# Patient Record
Sex: MALE | Race: WHITE
[De-identification: names, ages, dates, MRNs, and addresses within clinical notes are randomized per-mention and may not be internally consistent; named-entity substitution may affect disease eponyms.]

---

## 2017-03-08 ENCOUNTER — HOSPITAL ENCOUNTER (OUTPATIENT)
Dept: HOSPITAL 84 - D.CATH | Age: 77
Discharge: HOME | End: 2017-03-08
Attending: INTERNAL MEDICINE
Payer: MEDICARE

## 2017-03-08 VITALS
HEIGHT: 68 IN | WEIGHT: 200.42 LBS | HEIGHT: 68 IN | BODY MASS INDEX: 30.38 KG/M2 | BODY MASS INDEX: 30.38 KG/M2 | WEIGHT: 200.42 LBS

## 2017-03-08 VITALS — DIASTOLIC BLOOD PRESSURE: 69 MMHG | SYSTOLIC BLOOD PRESSURE: 150 MMHG

## 2017-03-08 DIAGNOSIS — I25.119: Primary | ICD-10-CM

## 2017-03-08 DIAGNOSIS — Z95.5: ICD-10-CM

## 2017-03-08 LAB
ANION GAP SERPL CALC-SCNC: 10.4 MMOL/L (ref 8–16)
BASOPHILS NFR BLD AUTO: 0.6 % (ref 0–2)
BUN SERPL-MCNC: 21 MG/DL (ref 7–18)
CALCIUM SERPL-MCNC: 9 MG/DL (ref 8.5–10.1)
CHLORIDE SERPL-SCNC: 106 MMOL/L (ref 98–107)
CO2 SERPL-SCNC: 28.7 MMOL/L (ref 21–32)
CREAT SERPL-MCNC: 1.2 MG/DL (ref 0.6–1.3)
EOSINOPHIL NFR BLD: 4.3 % (ref 0–7)
ERYTHROCYTE [DISTWIDTH] IN BLOOD BY AUTOMATED COUNT: 12.7 % (ref 11.5–14.5)
GLUCOSE SERPL-MCNC: 93 MG/DL (ref 74–106)
HCT VFR BLD CALC: 44 % (ref 42–54)
HGB BLD-MCNC: 14.8 G/DL (ref 13.5–17.5)
IMM GRANULOCYTES NFR BLD: 0.4 % (ref 0–5)
LYMPHOCYTES NFR BLD AUTO: 33 % (ref 15–50)
MCH RBC QN AUTO: 30.8 PG (ref 26–34)
MCHC RBC AUTO-ENTMCNC: 33.6 G/DL (ref 31–37)
MCV RBC: 91.5 FL (ref 80–100)
MONOCYTES NFR BLD: 11.3 % (ref 2–11)
NEUTROPHILS NFR BLD AUTO: 50.4 % (ref 40–80)
OSMOLALITY SERPL CALC.SUM OF ELEC: 283 MOSM/KG (ref 275–300)
PLATELET # BLD: 138 10X3/UL (ref 130–400)
PMV BLD AUTO: 9.3 FL (ref 7.4–10.4)
POTASSIUM SERPL-SCNC: 4.1 MMOL/L (ref 3.5–5.1)
RBC # BLD AUTO: 4.81 10X6/UL (ref 4.2–6.1)
SODIUM SERPL-SCNC: 141 MMOL/L (ref 136–145)
WBC # BLD AUTO: 4.7 10X3/UL (ref 4.8–10.8)

## 2017-03-08 NOTE — NUR
1020 RECEIVED PT FROM CATH LAB, PT IS ALERT, DENIES ANY C/O. TR BAND
CDI TO RIGHT WRIST, AREA IS FREE FROM BLEEDING OR HEMATOMA. CAP
REFILL IS BRISK, FINGERS WARM TO TOUCH. PO FLUIDS SERVED, WIFE AT
BEDSIDE. CALL LIGHT IN REACH.

## 2017-03-08 NOTE — NUR
1410 REVIEWED DC INSTRUCTIONS WITH PT AND WIFE WHO VERBALIZE
UNDERSTANDING. CATH SITE REMAINS FREE FROM BLEEDING/HEMATOMA. PT IS
DRESSED FOR DC. PT ESCORTED TO PRIVATE AUTO VIA WC BY STAFF WITH
WIFE DRIVING HIM HOME.

## 2017-03-08 NOTE — NUR
1300 PT DENIES ANY C/O. CATH SITE AREA IS FREE FROM BLEEDING OR
HEMATOMA. SINUS BRADYCARDIA PER MONITOR, PT DENIES ANY C/O. WIFE
AT BEDSIDE.

## 2017-03-08 NOTE — NUR
1035 TR BAND CDI, NO BLEEDING OR HEMATOMA NOTED. NSR ON MONITOR, VSS.
PT DENIES ANY C/O. WIFE AT BEDSIDE.

## 2017-03-08 NOTE — NUR
1105 PT DENIES NEEDS A THIS TIME, CATH SITE FREE FROM BLEEDING OR
HEMATOMA, TR BAND AND WRIST BRACE IN PLACE. PT DENIES ANY C/O CHEST
PAIN.

## 2017-03-08 NOTE — NUR
1205 PT HAS MICHAELA SANDWICH WITH  NO C/O NAUSEA. DENIES ANY C/O CHEST
DISCOMFORT. HAS VOIDED USING URINAL. CATH SITE FREE FROM BLEEDING OR
HEMATOMA, TR BAND AND WRIST BRACE IN PLACE. PT DENIES NEEDS AT THIS
TIME.

## 2017-03-08 NOTE — HEMODYNAMI
PATIENT:BEA HAN                                  MEDICAL RECORD: [id number]
: 38                                            LOCATION:D.CAT          
ACCT# [id number]                                       ADMISSION DATE: 17
 
 
 Generatedon:3/8/714326:11
Patient name: BEA HAN Patient #: [id number] Visit #: [id number] SSN: 
: 1940 Date of
study: 3/8/2017
Page: Of
Hemodynamic Procedure Report
****************************
Patient Data
Patient Demographics
Procedure consent was obtained
First Name: BEA          Gender: Male
Last Name: GUILLE           : 1940
Middle Initial: P           Age: 76 year(s)
Patient #: [id number]       Race: 
Visit #: [id number]
Accession #:
[id number]
Additional ID: [id number]
Contact details
Address: [address] [place] [address]      Phone: [phone]
State: [place]
City: [place]
Zip code: [address]
Past Medical History
Allergies
Allergen        Reaction        Date         Comments
Reported
Other allergy                   3/8/2017     adhesive
Admission
Admission Data
Admission Date: 3/8/2017    Admission Time: 7:52
Admit Source: Other
Lab Results
Lab Result Date: 3/8/2017   Lab Result Time: 0:00
Biochemistry
Name         Units    Result                Min      Max
Creatinine   mg/dl    1.2      --(---*)--   0.6      1.3
CBC
Name         Units    Result                Min      Max
Hemoglobin   g/dl     14.8     --(-*--)--   13.5     17.5
Procedure
Procedure Types
Cath Procedure
Diagnostic Procedure
[facility] w/Coronaries
FFR/IVUS
Intra-Coronary IVUS Initial
PCI Procedure
Coronary Stent Initial
Miscellaneous Procedures
Moderate Sedation up to 30 minutes
Procedure Description
Procedure Date
 
Procedure Date: 3/8/2017
Procedure Start Time: 9:48
Procedure End Time: 10:10
Procedure Staff
Name                            Function
Harvey Manjarrez MD                Performing Physician
Nehal Medellin RN                  Nurse
Memo Jay RT                  Scrub
Alexa Trejo RT             Monitor
Procedure Data
Cath Procedure
Fluoroscopy
Diagnostic fluoroscopy      Total fluoroscopy Time: 7.1
time: 7.1 min               min
Diagnostic fluoroscopy      Total fluoroscopy dose:
dose: 1511 mGy              1511 mGy
Contrast Material
Contrast Material Type                       Amount (ml)
Isovue 300                                   150
Entry Location
Entry     Primary  Successful  Side  Size  Upsize Upsize Entry    Closure     Dejesus
ccessful  Closure
Location                             (Fr)  1 (Fr) 2 (Fr) Remarks  Device        
          Remarks
Radial                         Right 6 Fr                         Mechanical
artery                               Short                        Compression
Estimated blood loss: 10 ml
Diagnostic catheters
Device Type               Used For           End Catheter
Placement
Cordis RBL 4 catheter (NO LV Angiography
CHARGE)
Cordis RBL 4 catheter (NO Left Coronary
CHARGE)                   Angiography
Cordis RBL 4 catheter (NO Right Coronary
CHARGE)                   Angiography
Procedure Complications
No complications
Procedure Medications
Medication           Administration Route Dosage
Oxygen               NC                   2 l/min
Heparin Flush Bag    added to field       2 bags
(1000units/500ml NS)
Lidocaine 2%         added to field       20
0.9% NaCl            I.V.                 100 ml/hr
Versed               I.V.                 1 mg
Fentanyl             I.V.                 50 mcg
Radial Cocktail      added to field       1 syringe
(Verapomil 2mg/Nitro
400mcg/Heparin
1500units)
Versed               I.V.                 1 mg
Fentanyl             I.V.                 50 mcg
Versed               I.V.                 1 mg
Fentanyl             I.V.                 50 mcg
Heparin Bolus        I.V.                 4000 units
Versed               I.V.                 1 mg
Fentanyl             I.V.                 50 mcg
Versed               I.V.                 1 mg
Hemodynamics
 
Rest
HGB: 14.8 (g/dl) Heart Rate: 48 (bpm)
Pressure Samples
Time     Site     Value (mmHg) Purpose      Heart      Use
Rate(bpm)
9:51     LV       101/15,18    Snapshot     52
Snapshots
Pre Cath      Intra         NCS           Post Cath
Vital Signs
Time     Heart  Resp   SPO2 etCO2   [id number] NIBP (mmHg) Rhythm  Pain    Sedation
Rate   (ipm)  (%)  (mmHg)  (mmHg)                      Status  Level
(bpm)
9:29:28  46     20     99   0       0       165/77(137) SB      0 (11)  10(A)
, No
pain
9:34:53  46     16     100  0       0       159/82(134) SB      0 (11)  10(A)
, No
pain
9:39:22  46     21     96   0       0       147/78(103) SB      0 (11)  10(A)
, No
pain
9:43:32  43     16     96   0       0       109/79(106) SB      0 (11)  10(A)
, No
pain
9:47:38  48     15     94   0       0       113/83(94)  SB      0 (11)  10(A)
, No
pain
9:51:52  55     15     96   0       0       102/64(87)  SB      0 (11)  9(A)
, No
pain
9:56:01  62     17     94   0       0       102/64(94)  NSR     0 (11)  9(A)
, No
pain
10:00:11 64     17     93   0       0       118/65(95)  NSR     0 (11)  9(A)
, No
pain
10:04:27 62     16     96   0       0       111/65(87)  NSR     0 (11)  9(A)
, No
pain
10:09:39 56     17     97   0       0       116/65(94)  SB      0 (11)  10(A)
, No
pain
Medications
Time     Medication       Route  Dose    Verified Delivered Reason          Note
s    Effectiveness
by       by
9:28:24  Oxygen           NC     2 l/min Harvey  Buffie    Per physician
Loki Medellin RN
9:28:32  Heparin Flush    added  2 bags  Harvey  Harvey   used for
Bag              to             Loki Manjarrez MD  procedure
(1000units/500ml field
NS)
9:28:39  Lidocaine 2%     added  20ml    Harvey  Harvey   used for
to     vial    Loki Manjarrez MD  procedure
field
9:28:51  0.9% NaCl        I.V.   100     Harvey  Buffie    Per physician
ml/hr   Loki Medellin RN
9:46:53  Versed           I.V.   1 mg    Harvey  Buffie    for sedation
Loki Medellin RN
9:46:59  Fentanyl         I.V.   50 mcg  Harvey  Buffie    for sedation
 
Loki Medellin RN
9:50:46  Radial Cocktail  added  1       Harvey  Buffie    used for
(Verapomil       to     syringe Loki Medellin RN   procedure
2mg/Nitro        field
400mcg/Heparin
1500units)
9:50:55  Versed           I.V.   1 mg    Harvey  Buffie    for sedation
Loki Medellin RN
9:50:58  Fentanyl         I.V.   50 mcg  Harvey  Buffie    for sedation
Loki Medellin RN
9:53:14  Versed           I.V.   1 mg    Harvey  Buffie    for sedation
Loki Medellin RN
9:53:18  Fentanyl         I.V.   50 mcg  Harvey  Buffie    for sedation
Loki Medellin RN
9:57:23  Heparin Bolus    I.V.   4000    Harvey  Buffie    for             veri
fied
units   Loki Medellin RN   anticoagulation with dr manjarrez
9:59:14  Versed           I.V.   1 mg    Harvey  Buffie    for sedation
Tauth MD Medellin RN
9:59:18  Fentanyl         I.V.   50 mcg  Harvey Calvert    for sedation
Loki Medellin RN
10:05:00 Versed           I.V.   1 mg    Harvey Calvert    for sedation
Loki Medellin RN
Procedure Log
Time     Note
9::09  Time tracking: Regular hours
9:08:14  Plan of Care:Hemodynamics will remain stable., Cardiac
rhythm will remain stable., Comfort level will be
maintained., Respiratory function will remain
adequate., Patient/ family verbilizes understanding of
procedure., Procedure tolerated without complication.,
Recovers from procedure without complications..
9:10:17  Alexa Counts RT(R) sent for patient. Start room
use.
9:10:47  H&P Date Dictated: 2016 Greater than 30 days; new
H&P dictated by physician. Or brief H&P completed..
9:11:27  Lab Result : Creatinine 1.2 mg/dl
9:11:27  Lab Result : Hemoglobin 14.8 g/dl
9:23:02  Patient received from Pre/Post Procedure Room to CCL 1
Alert and oriented. Tansferred to table in Supine
position.
9:23:03  Warm blankets applied, and carlos hugger turned on for
patient comfort.
9:23:04  Correct patient and procedure confirmed by team.
9:23:06  Signed procedure consent form obtained from patient.
9:23:07  ECG and BP/O2 sat monitors applied to patient.
9:28:09  Vital chart was started
9:28:24  Oxygen 2 l/min NC was given by Nehal Medellin RN; Per
physician;
9:28:32  Heparin Flush Bag (1000units/500ml NS) 2 bags added to
field was given by Harvey Manjarrez MD; used for
procedure;
9:28:39  Lidocaine 2% 20ml vial added to field was given by
Harvey Manjarrez MD; used for procedure;
9:28:46  Baseline sample Acquired.
9:28:51  0.9% NaCl 100 ml/hr I.V. was given by Nehal Medellin RN;
Per physician;
9:28:59  Baseline sample Acquired.
9:29:07  Rhythm: sinus bradycardia
 
9:29:09  Full Disclosure recording started
9:29:13  Pre-procedure instructions explained to patient.
9:29:13  Pre-op teaching completed and patient verbalized
understanding.
9:29:18  Family in waiting room.
9:29:20  Patient NPO since Midnight.
9:29:42  Patient allergic to Other allergyadhesive
9:29:45  Is the patient allergic to Iodine/contrast media? No.
9:29:48  Was the patient premedicated? No
9:30:21  Is patient on blood thinner?Yes
9:30:24  **ACC** The patient was administered the following
blood thiners within the last 24 hours: **ACC**Plavix
9:30:26  Patient diabetic? No.
9:30:29  Previous problem with sedation/anesthesia? No ?
9:30:32  Snore? No
9:30:33  Sleep apnea? No
9:30:33  Deviated septum? No
9:30:34  Opens mouth fully? Yes
9:30:35  Sticks out tongue? Yes
9:30:38  Airway obstruction? No ?
9:30:42  Dentures? No ?
9:30:51  Pre procedure: right dorsailis pedis pulse 1+
Palpable, but thready & weak; easily obliterated
9:30:53  Patient pain scale 0/10 ?.
9:31:02  IV patent on arrival in left forearm with 0.9% NaCl at
[facility].
9:31:06  Lab results completed and on chart.
9:31:12  Right Radial & Right Groin area was prepped with
chlora-prep and draped in sterile fashion
9:31:13  Alarms reviewed by R. N.
9:31:13  Sharps counted by scrub and verified by R.N.
9:33:13  Admit Source: Other
9:33:36  Use device set Radial Dx
9:33:36  Acist Syringe opened to sterile field.
9:33:37  Medline Cath Pack opened to sterile field.
9:33:40  Bag Decanter opened to sterile field.
9:33:41  Terumo 6Fr Slender Glidesheath opened to sterile
field.
9:33:42  St Lane 260cm J .035 wire opened to sterile field.
9:33:43  Acist Hand Control opened to sterile field.
9:33:43  Acist Manifold opened to sterile field.
9:33:53  Tegaderm 4 x 4 opened to sterile field.
9:38:52  Zero performed for pressure channel P1
9:39:33  Physician paged
9:46:31  Final Timeout: patient, procedure, and site verified
with staff and physician. All members of the team are
in agreement.
9:46:34  Right Radial site verified by team.
9:46:36  Physical assessment completed. ASA score P 2 - A
patient with mild systemic disease as per Harvey Manjarrez MD.
9:46:39  Sedation plan: IV Moderate Sedation Versed, Fentanyl
9:46:53  Versed 1 mg I.V. was given by Nehal Medellin RN; for
sedation;
9:46:59  Fentanyl 50 mcg I.V. was given by Nehal Medellin RN; for
sedation;
9:48:28  Procedure started.
9:48:39  Local anesthetic to right radial artery with Lidocaine
2% by Harvey Manjarrez MD.**INITIAL ACCESS ONLY**
9:49:52  A 6 Fr Short sheath was inserted into the Right Radial
 
artery
9:50:29  A Cordis RBL 4 catheter (NO CHARGE) was advanced over
the wire and used for LV Angiography.
9:50:46  Radial Cocktail (Verapomil 2mg/Nitro 400mcg/Heparin
1500units) 1 syringe added to field was given by
Nehal Medellin RN; used for procedure;
9:50:55  Versed 1 mg I.V. was given by Nehal Medellin RN; for
sedation;
9:50:58  Fentanyl 50 mcg I.V. was given by Nehal Medellin RN; for
sedation;
9:51:09  LV gram done using MOREL
9:51:10  LV hemodynamics recorded.
9:51:13  Injector settings: Ml/sec: 5, Volume: 15,
9:52:29  EF : 50 %
9:52:49  A Cordis RBL 4 catheter (NO CHARGE) was advanced over
the wire and used for Left Coronary Angiography.
9:53:14  Versed 1 mg I.V. was given by Nehal Medellin RN; for
sedation;
9:53:18  Fentanyl 50 mcg I.V. was given by Nehal Medellin RN; for
sedation;
9:54:28  A Cordis RBL 4 catheter (NO CHARGE) was advanced over
the wire and used for Right Coronary Angiography.
9:54:36  Merit BasixCompak Inflation Kit opened to sterile
field.
9:54:36  Corbin Whisper J 300cm 0.014 guide wire opened to
sterile field.
9:55:20  check24tronic Launcher 6Fr EBU 4.0 guide catheter opened
to sterile field.
9:55:30  Catheter removed.
9:55:41  **ACC** PCI Site: mLAD has 80% stenosis.
9:56:03  [place] IVUS Catheter opened to
sterile field.
9:56:41  6 Fr EBU 4.0 guide catheter was inserted over the wire
9:57:08  Whisper wire advanced.
9:57:23  Heparin Bolus 4000 units I.V. was given by Nehal Medellin
RN; for anticoagulation; verified with dr manjarrez
9:57:35  Procedure type changed to Cath procedure, Diagnostic
procedure, LHC, LHC w/Coronaries, FFR/IVUS,
Intra-Coronary IVUS Initial, PCI procedure, Coronary
Stent Initial, Miscellaneous Procedures, Moderate
Sedation up to 30 minutes
9:58:01  IVUS catheter advanced over wire.
9:58:21  IVUS pass to LAD lesion performed.
9:59:02  IVUS catheter removed over wire.
9:59:14  Versed 1 mg I.V. was given by Nehal Medellin RN; for
sedation;
9:59:18  Fentanyl 50 mcg I.V. was given by Nehal Medellin RN; for
sedation;
10:01:34 Inflation Number: 1 A Medtronic Resolute 2.5 X 26
stent was prepped and advanced across the Mid LAD. The
stent was deployed at 15 TORRI for 0:06 (min:sec).
10:02:54 Stent catheter was removed intact over wire.
10:04:24 Inflation Number: 1 A Medtronic Resolute 3.5 X 38
stent was prepped and advanced across the Prox LAD.
The stent was deployed at 13 TORRI for 0:06 (min:sec).
10:04:53 Stent catheter was removed intact over wire.
10:04:53 Wire removed.
10:04:54 Guide catheter removed.
10:05:00 Versed 1 mg I.V. was given by Nehal Medellin RN; for
sedation;
 
10:05:07 Sheath removed intact; hemostasis achieved with
Mechanical Compression to the Right Radial artery.
10:05:14 Procedure ended.(Physican Out)
10:05:28 Terumo TR Band Standard opened to sterile field.
10:05:46 Fluoroscopy time 07.10 minutes.
10:05:50 Fluoroscopy dose: 1511 mGy
10:05:50 Flurop Dose total: 1511
10:05:54 Contrast amount:Isovue 300 150ml.
10:05:55 Sharps counted by scrub and verified by R.N.
10:05:58 TR band inflated with 12cc of air.
10:06:00 Insertion/operative site no bleeding no hematoma.
10:06:08 Post right radial artery:stable, clean and dry
10:06:10 Post Procedure Pulses reassessed and unchanged
10:06:13 Post-procedure physical assessment completed. ASA
score P 2 - A patient with mild systemic disease as
per Harvey Manjarrez MD.
10:06:16 Post procedure rhythm: unchanged.
10:06:18 Estimated blood loss: 10 ml
10:06:20 Post procedure instruction explained to
patient.Patient verbalizes understanding.
10:06:20 Patient needs reinforcement of post procedure
teaching.
10:06:43 Procedure Complication : No complications
10:06:45 See physician's report for complete and final results.
10:07:31 Procedure and supply charges have been captured,
reviewed, submitted and are correct.
10:10:34 Vital chart was stopped
10:10:36 Report given to Pre/Post Procedure Room.
10:10:39 Patient transfered to Pre/Post Procedure Room with
Stretcher.
10:10:42 Procedure ended.
10:10:42 Full Disclosure recording stopped
10:10:47 End room use (Document Last)
Intervention Summary
Intervention Notes
Time     ActionType  Lesion and  Equipment Action#  Pressure  Duration
Attributes  Used
10:01:34 Place stent Mid LAD     Medtronic 1        15        00:06
Resolute
2.5 X 26
stent
10:04:24 Place stent Prox LAD    Medtronic 1        13        00:06
Resolute
3.5 X 38
stent
Device Usage
Item Name   Manufacture  Quantity  Catalog      Hospital Part    Current Minimal
 Lot# /
Number       Charge   Number  Stock   Stock   Serial#
Code
Acist       Acist        1         [id number]        989472   752503  743565  20
Syringe     Medical
Systems Inc
Medline     Cardinal     1         [id number]    [id number]   55171   457509  5
Cath Pack   Health
Bag         Microtek     1         S        593647   98576   688868  5
[employer].
Terumo 6Fr  Terumo       1         [id number]   023551   973464  050813  40
Slender
Glidesheath
 
St Lane     St Lane      1         [id number]       727727   325642  132960  30
260cm J
.035 wire
Acist Hand  Acist        1         08963        051223   370792  044392  5
Control     Medical
Systems Inc
Acist       Acist        1         42066        800618   940482  986247  5
Manifold    Medical
Systems Inc
Tegaderm 4  3M           1         1626W        410260   606374  477542  5
x 4
Cordis RBL  Cardinal     1         [id number]      147938           707500  5
4 catheter  Health
(NO CHARGE)
Merit       Merit        1         [id number]       265158   970478  910478  15
BasixCompak Medical
Inflation
Kit
Corbin      Corbin       1         [id number]    151599   862409  443299  5
Whisper J   Vascular
300cm 0.014
guide wire
Medtronic   Medtronic    1         [id number]     369241   25934   884616  1
Launcher
6Fr EBU 4.0
guide
catheter
[place]     [place]      1         [id number]       348891   039585  152110  8
[place]
IVUS
Catheter
Medtronic   Medtronic    1         [id number]  893964           389552  1      
 [id number]
Resolute
2.5 X 26
stent
Medtronic   Medtronic    1         [id number]  291769           345484  0      
 [id number]
Resolute
3.5 X 38
stent
Terumo TR   Terumo       1         [id number]    946464   905957  232415  40
Band
Standard
Signature Audit [place]
Stage           Time        Signature      Unsigned
Intra-Procedure 3/8/2017    Alexa
10:10:57 AM Counts RT(R)
Signatures
Monitor : Alexa     Signature :
Counts RT               ______________________________
Date : ______________ Time :
______________
 
[address] [place] [address]

## 2017-03-10 ENCOUNTER — HOSPITAL ENCOUNTER (OUTPATIENT)
Dept: HOSPITAL 84 - D.CATH | Age: 77
Discharge: HOME | End: 2017-03-10
Attending: INTERNAL MEDICINE
Payer: MEDICARE

## 2017-03-10 VITALS
BODY MASS INDEX: 30.38 KG/M2 | HEIGHT: 68 IN | BODY MASS INDEX: 30.38 KG/M2 | WEIGHT: 200.42 LBS | HEIGHT: 68 IN | WEIGHT: 200.42 LBS

## 2017-03-10 VITALS — DIASTOLIC BLOOD PRESSURE: 78 MMHG | SYSTOLIC BLOOD PRESSURE: 144 MMHG

## 2017-03-10 DIAGNOSIS — Z95.5: ICD-10-CM

## 2017-03-10 DIAGNOSIS — I25.119: Primary | ICD-10-CM

## 2017-03-10 LAB
ANION GAP SERPL CALC-SCNC: 8.7 MMOL/L (ref 8–16)
BASOPHILS NFR BLD AUTO: 0.7 % (ref 0–2)
BUN SERPL-MCNC: 24 MG/DL (ref 7–18)
CALCIUM SERPL-MCNC: 9 MG/DL (ref 8.5–10.1)
CHLORIDE SERPL-SCNC: 108 MMOL/L (ref 98–107)
CO2 SERPL-SCNC: 30.9 MMOL/L (ref 21–32)
CREAT SERPL-MCNC: 1.2 MG/DL (ref 0.6–1.3)
EOSINOPHIL NFR BLD: 6 % (ref 0–7)
ERYTHROCYTE [DISTWIDTH] IN BLOOD BY AUTOMATED COUNT: 12.8 % (ref 11.5–14.5)
GLUCOSE SERPL-MCNC: 102 MG/DL (ref 74–106)
HCT VFR BLD CALC: 42.9 % (ref 42–54)
HGB BLD-MCNC: 14.4 G/DL (ref 13.5–17.5)
IMM GRANULOCYTES NFR BLD: 0.2 % (ref 0–5)
LYMPHOCYTES NFR BLD AUTO: 33.6 % (ref 15–50)
MCH RBC QN AUTO: 30.6 PG (ref 26–34)
MCHC RBC AUTO-ENTMCNC: 33.6 G/DL (ref 31–37)
MCV RBC: 91.3 FL (ref 80–100)
MONOCYTES NFR BLD: 12.2 % (ref 2–11)
NEUTROPHILS NFR BLD AUTO: 47.3 % (ref 40–80)
OSMOLALITY SERPL CALC.SUM OF ELEC: 288 MOSM/KG (ref 275–300)
PLATELET # BLD: 145 10X3/UL (ref 130–400)
PMV BLD AUTO: 9.7 FL (ref 7.4–10.4)
POTASSIUM SERPL-SCNC: 4.6 MMOL/L (ref 3.5–5.1)
RBC # BLD AUTO: 4.7 10X6/UL (ref 4.2–6.1)
SODIUM SERPL-SCNC: 143 MMOL/L (ref 136–145)
WBC # BLD AUTO: 4.5 10X3/UL (ref 4.8–10.8)

## 2017-03-10 NOTE — NUR
DISCHARGE INSTRUCTIONS GIVEN, VERBALIZED UNDERSTANDING. TAKEN OUT VIA
WHEELCHAIR BY CATH TEAM MEMBER. LEFT FACILITY WITH FAMILY MEMBER AND
ALL PERSONAL BELONGINGS.

## 2017-03-10 NOTE — HEMODYNAMI
PATIENT:BEA HAN                                  MEDICAL RECORD: [id number]
: 38                                            LOCATION:[facility]          
ACCT# [id number]                                       ADMISSION DATE: 03/10/17
 
 
 Generatedon:3/10/49939:44
Patient name: BEA HAN Patient #: [id number] Visit #: [id number] SSN: 
: 1940 Date
of study: 3/10/2017
Page: Of
Hemodynamic Procedure Report
****************************
Patient Data
Patient Demographics
Procedure consent was obtained
First Name: BEA          Gender: Male
Last Name: GUILLE           : 1940
Middle Initial: P           Age: 76 year(s)
Patient #: [id number]       Race: 
Visit #: [id number]
Accession #:
[id number]
Additional ID: [id number]
Contact details
Address: [address] [place] [address]      Phone: [phone]
State: [place]
City: [place]
Zip code: [address]
Past Medical History
Allergies
Allergen        Reaction        Date         Comments
Reported
Other allergy                   3/8/2017     adhesive
Adhesive tape                   3/10/2017
Admission
Admission Data
Admission Date: 3/10/2017   Admission Time: 7:13
Lab Results
Lab Result Date: 3/10/2017  Lab Result Time: 0:00
Biochemistry
Name         Units    Result                Min      Max
Creatinine   mg/dl    1.2      --(---*)--   0.6      1.3
CBC
Name         Units    Result                Min      Max
Hemoglobin   g/dl     14.4     --(*---)--   13.5     17.5
Procedure
Procedure Types
Cath Procedure
PCI Procedure
Coronary Stent Initial
Miscellaneous Procedures
Moderate Sedation up to 30 minutes
Procedure Description
Procedure Date
Procedure Date: 3/10/2017
Procedure Start Time: 9:23
Procedure End Time: 9:41
Procedure Staff
Name                            Function
 
Harvey Manjarrez MD                Performing Physician
Nehal Medellin RN                  Nurse
Alexa Trejo RT             Monitor
Hna De La Rosa RT                  Scrub
Procedure Data
Cath Procedure
Fluoroscopy
Diagnostic fluoroscopy      Total fluoroscopy Time: 8.8
time: 8.8 min               min
Diagnostic fluoroscopy      Total fluoroscopy dose: 603
dose: 603 mGy               mGy
Contrast Material
Contrast Material Type                       Amount (ml)
Isovue 300                                   63
Entry Location
Entry     Primary  Successful  Side  Size  Upsize Upsize Entry    Closure Succes
sful  Closure
Location                             (Fr)  1 (Fr) 2 (Fr) Remarks  Device        
      Remarks
Femoral                        Right 7 Fr                         Exoseal
artery                               Short
Estimated blood loss: 10 ml
Procedure Complications
No complications
Procedure Medications
Medication           Administration Route Dosage
Oxygen               NC                   2 l/min
Lidocaine 2%         added to field       20
Heparin Flush Bag    added to field       2 bags
(1000units/500ml NS)
0.9% NaCl            I.V.                 100 ml/hr
Versed               I.V.                 1 mg
Fentanyl             I.V.                 50 mcg
Heparin Bolus        I.V.                 4000 units
Versed               I.V.                 1 mg
Fentanyl             I.V.                 50 mcg
Versed               I.V.                 1 mg
Fentanyl             I.V.                 50 mcg
Versed               I.V.                 1 mg
Fentanyl             I.V.                 50 mcg
Versed               I.V.                 1 mg
Hemodynamics
Rest
HGB: 14.4 (g/dl) Heart Rate: 45 (bpm)
Snapshots
Pre Cath      Intra         NCS           Post Cath
Vital Signs
Time    Heart  Resp   SPO2 etCO2   [id number] NIBP (mmHg) Rhythm  Pain    Sedation
Rate   (ipm)  (%)  (mmHg)  (mmHg)                      Status  Level
(bpm)
8:59:04 43     20     99   0       0       148/79(136) NSR     0 (11)  10(A)
, No
pain
9:03:26 45     16     100  0       0       143/83(124) NSR     0 (11)  10(A)
, No
pain
9:08:31 46     19     99   0       0       144/82(129) NSR     0 (11)  10(A)
, No
pain
9:13:42 48     17     96   0       0       136/72(113) NSR     0 (11)  10(A)
 
, No
pain
9:18:57 46     17     94   0       0       124/76(98)  NSR     0 (11)  10(A)
, No
pain
9:22:59 50     15     94   0       0       121/87(97)  NSR     0 (11)  9(A)
, No
pain
9:27:15 63     17     94   0       0       127/63(93)  NSR     0 (11)  9(A)
, No
pain
9:31:35 51     18     95   0       0       105/58(81)  NSR     0 (11)  9(A)
, No
pain
9:35:43 53     18     96   0       0       117/70(98)  NSR     0 (11)  9(A)
, No
pain
9:39:55 56     16     98   0       0       125/71(97)  NSR     0 (11)  10(A)
, No
pain
Medications
Time    Medication       Route  Dose  Verified Delivered Reason          Notes  
  Effectiveness
by       by
8:59:17 Oxygen           NC     2     Harvey  Buffie    used for
l/min Loki Medellin RN   procedure
9:01:07 Lidocaine 2%     added  20ml  Harvey  Harvey   for local
to     vial  Loki Manjarrez MD  anesthetic
field
9:01:13 Heparin Flush    added  2     Harvey  Harvey   used for
Bag              to     bags  Loki Manjarrez MD  procedure
(1000units/500ml field
NS)
9:03:39 0.9% NaCl        I.V.   100   Harvey  Buffie    Per physician
ml/hr Loki Medellin RN
9:15:32 Versed           I.V.   1 mg  Harvey  Buffie    for sedation
Loki Medellin RN
9:15:38 Fentanyl         I.V.   50    Harvey  Buffie    for sedation
mcg   Loki Medellin RN
9:19:56 Versed           I.V.   1 mg  Harvey  Buffie    for sedation
Loki Medellin RN
9:19:59 Fentanyl         I.V.   50    Harvey  Buffie    for sedation
mcg   Loki Medellin RN
9:24:45 Heparin Bolus    I.V.   4000  Harvey  Buffie    for             verifie
d
units Loki Medellin RN   anticoagulation with dr manjarrez
9:26:24 Versed           I.V.   1 mg  Harvey  Buffie    for sedation
Loki Medellin RN
9:26:27 Fentanyl         I.V.   50    Harvey  Buffie    for sedation
mcg   Loki Medellin RN
9:30:36 Versed           I.V.   1 mg  Harvey  Buffie    for sedation
Loki Medellin RN
9:30:40 Fentanyl         I.V.   50    Harvey  Buffie    for sedation
mcg   Loki Medellin RN
9:36:03 Versed           I.V.   1 mg  Harvey  Buffie    for sedation
Loki Medellin RN
Procedure Log
Time     Note
8:45:18  Nehal Medellin RN sent for patient. Start room use.
 
8:45:19  Time tracking: Regular hours
8:45:23  Plan of Care:Hemodynamics will remain stable., Cardiac
rhythm will remain stable., Comfort level will be
maintained., Respiratory function will remain
adequate., Patient/ family verbilizes understanding of
procedure., Procedure tolerated without complication.,
Recovers from procedure without complications..
8:45:27  Use device set Femoral PCI
8:49:13  PCI Cath status Elective
8:49:28  Patient received from Pre/Post Procedure Room to CCL 1
Alert and oriented. Tansferred to table in Supine
position.
8:49:29  Warm blankets applied, and carlos hugger turned on for
patient comfort.
8:49:30  Correct patient and procedure confirmed by team.
8:49:31  Signed procedure consent form obtained from patient.
8:49:32  ECG and BP/O2 sat monitors applied to patient.
8:49:33  Full Disclosure recording started
8:57:58  Vital chart was started
8:58:03  Rhythm: sinus bradycardia
8:59:04  H&P Date Dictated: 3/8/2017 Within 30 days and on
chart., H&P Addendum completed by physician on day of
procedure. (MUST COMPLETE FOR ALL OUTPATIENTS).
8:59:05  Pre-procedure instructions explained to patient.
8:59:05  Pre-op teaching completed and patient verbalized
understanding.
8:59:07  Family in waiting room.
8:59:09  Patient NPO since Midnight.
8:59:16  Patient allergic to Adhesive tape
8:59:17  Oxygen 2 l/min NC was given by Nehal Medellin RN; used
for procedure;
8:59:19  Is the patient allergic to Iodine/contrast media? No.
8:59:21  Is patient on blood thinner?Yes
8:59:24  **ACC** The patient was administered the following
blood thiners within the last 24 hours: **ACC**Plavix
8:59:26  Patient diabetic? No.
8:59:52  Previous problem with sedation/anesthesia? No ?
8:59:53  Snore? No
8:59:54  Sleep apnea? No
8:59:55  Deviated septum? No
8:59:56  Opens mouth fully? Yes
8:59:57  Sticks out tongue? Yes
9:00:05  Airway obstruction? No ?
9:00:06  Dentures? No ?
9:00:10  Pre procedure: right dorsailis pedis pulse 2+ Normal;
easily identifiable; not easily obliterated
9:00:12  Patient pain scale 0/10 ?.
9:00:16  IV patent on arrival in left hand with 0.9% NaCl at
KVO.
9:00:38  Lab Result : Creatinine 1.2 mg/dl
9:00:38  Lab Result : Hemoglobin 14.4 g/dl
9:00:42  Lab results completed and on chart.
9:00:45  Right groin area was prepped with chlora-prep and
draped in sterile fashion
9:00:45  Alarms reviewed by R. N.
9:00:46  Sharps counted by scrub and verified by R.N.
9:01:07  Lidocaine 2% 20ml vial added to field was given by
Harvey Manjarrez MD; for local anesthetic;
9:01:13  Heparin Flush Bag (1000units/500ml NS) 2 bags added to
field was given by Harvey Manjarrez MD; used for
 
procedure;
9:02:38  Acist Syringe opened to sterile field.
9:02:39  Acist Hand Control opened to sterile field.
9:02:39  Bag Decanter opened to sterile field.
9:02:40  Medline Cath Pack opened to sterile field.
9:02:44  St Lane 260cm J .035 wire opened to sterile field.
9:02:45  Merit BasixCompak Inflation Kit opened to sterile
field.
9:02:45  Acist Manifold opened to sterile field.
9:02:46  Tegaderm 4 x 4 opened to sterile field.
9:02:47  Terumo 7Fr [place] Sheath opened to sterile field.
9:03:39  0.9% NaCl 100 ml/hr I.V. was given by Nehal Medellin RN;
Per physician;
9:05:39  Baseline sample Acquired.
9:09:20  Physician paged
9:15:01  Zero performed for pressure channel P1
9:15:05  Final Timeout: patient, procedure, and site verified
with staff and physician. All members of the team are
in agreement.
9:15:07  Right groin site verified by team.
9:15:11  Physical assessment completed. ASA score P 2 - A
patient with mild systemic disease as per Harvey Manjarrez MD.
9:15:15  Sedation plan: IV Moderate Sedation Versed, Fentanyl
9:15:32  Versed 1 mg I.V. was given by Nehal Medellin RN; for
sedation;
9:15:38  Fentanyl 50 mcg I.V. was given by Nehal Medellin RN; for
sedation;
9:19:56  Versed 1 mg I.V. was given by Nehal Medellin RN; for
sedation;
9:19:59  Fentanyl 50 mcg I.V. was given by Nehal Medellin RN; for
sedation;
9:23:02  Procedure started.
9:23:08  Local anesthetic to right femoral artery with
Lidocaine 2% by Harvey Manjarrez MD.**INITIAL ACCESS
ONLY**
9:23:35  [place] Sci Choice PT Extra Support J 300cm .014 gu
opened to sterile field.
9:23:44  Medtronic Launcher 7Fr [place] 2.0 guide catheter opened to
sterile field.
9:23:59  A 7 Fr Short sheath was inserted into the Right
Femoral artery
9:24:06  7 Fr AR 2.0 guide catheter was inserted over the wire
9:24:45  Heparin Bolus 4000 units I.V. was given by Nehal Medellin
RN; for anticoagulation; verified with dr manjarrez
9:25:55  Choice PT ES wire advanced.
9:26:24  Versed 1 mg I.V. was given by Nehal Medellin RN; for
sedation;
9:26:27  Fentanyl 50 mcg I.V. was given by Nehal Medellin RN; for
sedation;
9:29:26  The Medtronic Integrity 3.5 X 18 stent was advanced
then removed because of failure to cross lesion
9:30:36  Versed 1 mg I.V. was given by Nehal Medellin RN; for
sedation;
9:30:40  Fentanyl 50 mcg I.V. was given by Nehal Medellin RN; for
sedation;
9:31:43  Guideliner 6Fr Catheter opened to sterile field.
9:32:01  The [place] Sci [place] 3.5 X 20 balloon was advanced
and then removed because of failure to cross lesion
9:33:10  6 Fr Guidliner guide catheter was inserted over the
 
wire
9:34:29  Inflation number: 1 A [place] Sci [place] 3.5 X 20
balloon was prepped and advanced across the Mid RCA,
then inflated to 13 TORRI for 0:09 (min:sec).
9:34:38  Balloon removed over the wire.
9:36:03  Versed 1 mg I.V. was given by Nehal Medellin RN; for
sedation;
9:37:15  Inflation Number: 2 A Medtronic Integrity 3.5 X 18
stent was prepped and advanced across the Mid RCA. The
stent was deployed at 13 TORRI for 0:11 (min:sec).
9:37:39  Inflation number: 3 The stent balloon was then
re-inflated across the Mid RCA to 17 TORRI for 0:08
(min:sec).
9:37:49  Stent catheter was removed intact over wire.
9:38:03  Wire removed.
9:38:03  Guide catheter removed.
9:38:05  Guide catheter removed.
9:38:42  Sheath removed intact; hemostasis achieved with
Exoseal to the Right Femoral artery.
9:38:45  Procedure ended.(Physican Out)
9:39:10  Fluoroscopy time 08.80 minutes.
9:39:17  Flurop Dose total: 603
9:39:17  Fluoroscopy dose: 603 mGy
9:39:22  Contrast amount:Isovue 300 63ml.
9:39:23  Sharps counted by scrub and verified by R.N.
9:39:24  Insertion/operative site no bleeding no hematoma.
9:39:27  Post-op/insertion site Right Femoral artery dressed
using a 4 x 4 and Tegaderm.
9:39:30  Post right femoral artery:stable, clean and dry
9:39:32  Post Procedure Pulses reassessed and unchanged
9:39:37  Post-procedure physical assessment completed. ASA
score P 2 - A patient with mild systemic disease as
per Harvey Manjarrez MD.
9:39:40  Post procedure rhythm: unchanged.
9:39:42  Estimated blood loss: 10 ml
9:39:44  Post procedure instruction explained to
patient.Patient verbalizes understanding.
9:39:44  Patient needs reinforcement of post procedure
teaching.
9:39:58  Procedure type changed to Cath procedure, PCI
procedure, Coronary Stent Initial, Miscellaneous
Procedures, Moderate Sedation up to 30 minutes
9:40:05  Procedure Complication : No complications
9:40:07  See physician's report for complete and final results.
9:40:29  Cordis 7Fr Exoseal opened to sterile field.
9:41:30  Procedure and supply charges have been captured,
reviewed, submitted and are correct.
9:41:33  Vital chart was stopped
9:41:36  Report given to Pre/Post Procedure Room.
9:41:42  Patient transfered to Pre/Post Procedure Room with
Stretcher.
9:41:50  Procedure ended.
9:41:50  Full Disclosure recording stopped
9:41:53  End room use (Document Last)
Intervention Summary
Intervention Notes
Time    ActionType  Lesion and  Equipment Action#  Pressure  Duration
Attributes  Used
9:29:26 Discard                 Medtronic
Stent                   Integrity
 
3.5 X 18
stent
9:32:01 Discard                 [place]
Balloon                 Sci
[place]
3.5 X 20
balloon
9:34:29 Inflate     Mid RCA     [place]    1        13        00:09
balloon                 Sci
[place]
3.5 X 20
balloon
9:37:15 Place stent Mid RCA     Medtronic 2        13        00:11
Integrity
3.5 X 18
stent
9:37:39 Reinflate   Mid RCA     Medtronic 3        17        00:08
stent                   Integrity
balloon                 3.5 X 18
stent
Device Usage
Item Name   Manufacture  Quantity  Catalog Number  Hospital Part    Current Mini
mal Lot# /
Charge   Number  Stock   Stock   Serial#
Code
Acist       Acist        1         [id number]           909305   786256  037999  20
Syringe     Medical
Systems Inc
Acist Hand  Acist        1         42269           583780   115001  359898  5
SpotOn     Medical
Systems Inc
Bag         Microtek     1         2002S           803501   77667   500672  5
[employer] Park Pro.
Medline     Cardinal     1         [id number]       367705   10731   448276  5
Cath Greenext
St Lane     St [address]      1         870441          929096   593595  987141  30
260cm J
.035 wire
Merit       Merit        1         [id number]          500499   416436  845526  15
[employer] Medical
Inflation
Kit
Acist       Acist        1         13760           205575   021475  027385  5
Docstoc    Medical
Systems Inc
Tegaderm 4  3M           1         1626W           945279   323690  614373  5
x 4
Terumo 7Fr  Terumo       1         [id number]          521286   595388  541015  5
[place]
Sheath
[place] Sci  [place]       1         [id number]   [id number]  183144  5
Choice PT   Scientific
Extra
Support J
300cm .014
gu
Medtronic   Medtronic    1         [id number]         572046   592175  502761  0
Launcher
7Fr AR 2.0
guide
 
catheter
Medtronic   Medtronic    1         [id number]       841472           140191  1   
    [id number]
Integrity
3.5 X 18
stent
Guideliner  Vascular     1         5571            405449   764963  075680  1
6Fr         Solutions
Catheter
[place] Sci  [place]       1         [id number]  689258   341305  200210  1
[place]    Scientific
3.5 X 20
balloon
Cordis 7Fr  Cardinal     1                    052407   104832  176248  5
[facility]     [facility]
Signature Audit [place]
Stage           Time        Signature      Unsigned
Intra-Procedure 3/10/2017   Alexa
9:44:52 AM  Counts RT(R)
Signatures
Monitor : Alexa     Signature :
Counts RT               ______________________________
Date : ______________ Time :
______________
 
 
 
 
 
 
 
 
 
 
 
 
 
 
 
 
 
 
 
 
 
 
 
 
 
 
 
 
 
 
 
[address] [place] [address]

## 2017-03-10 NOTE — NUR
RESTING IN BED WITH EYES CLOSED, VSS. NO RESP DISTRESS NOTED. NO C/O
N/V OR CHEST PAIN. WILL CONTINUE TO MONITOR.

## 2017-03-10 NOTE — NUR
DR. COHEN AT BEDSIDE SPEAKING WITH PT AND FAMILY. VSS. RIGHT GROIN
DRESSING CDI, NO BLEEDING OR HEMATOMA NOTED. NO C/O AT THIS TIME.

## 2017-03-10 NOTE — NUR
RESTING IN BED, VSS. 2L NASAL CANNULA, NO RESP DISTRESS NOTED.
INSTRUCTED PT TO KEEP RIGHT LEG STRAIGHT AND HEAD FLAT ON PILLOW.
RIGHT GROIN DRESSING CDI, NO BLEEDING OR HEMATOMA NOTED. PULSES
PALPABLE X4. WILL CONTINUE TO MONITOR.

## 2017-03-13 NOTE — OP
PATIENT NAME:  BEA HAN                           MEDICAL RECORD: [id number]
:38                                             LOCATION:[facility]          
                                                         ADMISSION DATE:        
SURGEON:  SRUTHI COHEN MD             
 
 
DATE OF OPERATION:  2017
 
PROCEDURES:
1.  PTCA stent LAD.
2.  Left heart catheterization.
3.  Selective coronary angiography.
4.  Intravascular ultrasound.
5.  Left ventriculogram.
 
INDICATION:  Angina and coronary artery disease.
 
PROCEDURE IN DETAIL:  After informed consent was obtained and after a detailed
explanation of the risks, benefits as well as alternative therapies, the patient
elected to proceed with angiogram and angioplasty.  The right radial area was
prepped and draped in normal sterile fashion.  The right radial artery was
cannulated via modified Seldinger technique with placement of 6-Azerbaijani sheath. 
All catheters exchanged through this sheath.
 
FINDINGS:  Left ventriculogram was performed in standard 30-degree MOREL view
reveals preserved cardiac wall motion, ejection fraction 55% to 60%.
 
SELECTIVE CORONARY ANGIOGRAPHY:
1.  Left main has no significant angiographic disease.
2.  Left anterior descending has a long area greater than 80% stenosis confirmed
by intravascular ultrasound through the entire mid vessel.
3.  Left circumflex shows moderate irregularities, but no flow-limiting
stenosis.
4.  Right coronary is tortuous, calcified and has a 90% stenosis in the mid
vessel.  The right coronary artery is better amenable via groin approach with
7-Azerbaijani system.
 
PERCUTANEOUS TRANSLUMINAL CORONARY ANGIOPLASTY OF THE LAD:  The stent used were
3.5 x 38 and 2.5 x 26, both Resolute stents.  Result was 0% residual stenosis.
 
OVERALL IMPRESSION:  Successful percutaneous transluminal coronary angioplasty
stent of the left anterior descending going from multiple areas of greater than
80% initial stenosis to 0% residual.
 
PLAN:  PTCA stent of the RCA in the near future via groin approach with 7-Azerbaijani
system.
 
TRANSINT:[id number] Voice Confirmation ID: [id number] DOCUMENT ID: [id number]
                                           
                                           SRUTHI COHEN MD             
 
 
 
Electronically Signed by SRUTHI COHEN on 17 at 1008
CC:                                                             [id number]
DICTATION DATE: 17 1011     :     17 1242      DEP CLI 
                                                                      17
[place] [address]

## 2017-03-13 NOTE — HP
PATIENT: BEA HAN                                 MEDICAL RECORD: [id number]
ACCOUNT: [id number]                                    LOCATION:D.CAT         
: 38                                            ADMISSION DATE: 03/10/17
                                                         
 
                             HISTORY AND PHYSICAL EXAMINATION
 
 
ADMITTING DIAGNOSES:
1.  Angina.
2.  Coronary artery disease.
3.  Recent percutaneous transluminal coronary angioplasty stent of the left
anterior descending with concomitant disease of the right coronary artery.
 
HISTORY OF PRESENT ILLNESS:  Mr. Han presents with anginal symptomatology,
found to have significant disease of the LAD and RCA, underwent successful PTCA
stent of the LAD and is now brought for PTCA stent of the RCA.
 
PHYSICAL EXAMINATION:
GENERAL APPEARANCE:  Well-nourished, well-developed, appears stated age.  Level
of distress, comfortable. 
PSYCHIATRIC:  Mental status, alert, normal affect.  Orientation, oriented to
time, place and person.  
EYES:  Lids and conjunctiva, noninjected.  No discharge, no pallor. 
ENT:  Lips, teeth, gums, normal dentition.  Oropharynx, no cyanosis, no pallor. 
NECK:  Carotid arteries, bilateral normal upstroke, no bruits, no thrills. 
JUGULAR VEINS:  No jugular venous pressure or distention. 
CERVICAL LYMPH NODES:  Nontender, nonenlarged.  
THYROID:  Not enlarged.  Nontender.  No nodules. 
LUNGS:  Respiratory effort, unlabored. 
CHEST:  Normal curvature.  No thoracic deformity.  No chest wall tenderness. 
Percussion, resonant.  Auscultation, clear.  No wheezes, no rales, no rhonchi. 
CARDIOVASCULAR:  Precordial exam, nondisplaced.  No heaves or pericardial
thrills.  Rate and rhythm, regular.  Heart sounds, normal S1, normal S2.  No S3,
no gallop, no rub.  Systolic murmur, not heard.  Diastolic murmur, not heard. 
EXTREMITIES:  No cyanosis, no edema.  Peripheral pulses, full and equal in all
extremities, except as noted.  No bruits appreciated. 
ABDOMEN:  Soft, nondistended.  Normal aorta.  No bruit.  Nontender.  No masses. 
Liver, nontender, no hepatomegaly.  Spleen, nontender, no splenomegaly.  
MUSCULOSKELETAL:  No joint tenderness.  No joint swelling.  No erythema.  
NEUROLOGICAL:  Normal gait, normal strength, normal tone.
SKIN:  Warm and dry.
 
REVIEW OF SYSTEMS:  The patient reports easy bruising but reports no swollen
glands. The patient reports no fever, no night sweats, no significant weight
gain, no significant weight loss.  No significant exercise tolerance.  The
patient reports no dry eyes, no irritation, no vision change.  Patient reports
no difficulty hearing and no ear pain.  Patient reports no frequent nose bleeds
or nose and sinus problems.  Patient reports on arm pain on exertion.   No
shortness of breath while lying down.  No history of heart murmur.  Patient
reports no cough, no wheezing or coughing up blood.  Patient reports no
abdominal pain, no vomiting.  Normal appetite.  No diarrhea and not vomiting
blood.  No nausea and no constipation.  Patient reports no incontinence.  No
difficulty urinating.  No hematuria.  No increased frequency.  Patient reports
no muscle aches.  No weakness, no arthralgias, no back pain.  No swelling of the
extremities.  Patient reports no abnormal mole, no jaundice, no rashes.  Reports
no loss of consciousness.  No weakness and no numbness.  No seizures, dizziness,
or headaches.  The patient reports no depression, no sleep disturbance, feeling
 
 
 
HISTORY AND PHYSICAL                           [id number]    BEA HAN         
 
 
safe in a relationship and no alcohol abuse.  Patient reports on fatigue. 
Reports no runny nose or sinus pressure.  No itching, no hives, and no frequent
sneezing.
 
OVERALL IMPRESSION:  Anginal symptomatology with significant disease of the
right coronary artery.  We will proceed with PTCA stent of the right coronary
artery.
 
TRANSINT:[id number] Voice Confirmation ID: [id number] DOCUMENT ID: [id number]
 
 
                                           
                                           SRUTHI COHEN MD             
 
 
 
Electronically Signed by SRUTHI COHEN on 17 at 1008
 
 
 
 
 
 
 
 
 
 
 
 
 
 
 
 
 
 
 
 
 
 
 
 
 
 
 
 
 
 
CC:                                                             [id number]
DICTATION DATE: 03/10/17 0916     :     03/10/17 0927      DEP CLI 
                                                                      03/10/17
[place] [id number]

## 2017-03-13 NOTE — OP
PATIENT NAME:  BEA HAN                           MEDICAL RECORD: [id number]
:38                                             LOCATION:D.CAT          
                                                         ADMISSION DATE:        
SURGEON:  SRUTHI COHEN MD             
 
 
DATE OF OPERATION:  03/10/2017
 
PROCEDURES:
1.  PTCA stent, RCA.
2.  Selective coronary angiography.
 
INDICATIONS:  Angina and coronary artery disease.
 
PROCEDURE IN DETAIL:  After informed consent was obtained and after a detailed
explanation of risks, benefits as well as alternative therapies, the patient
elected to proceed with angiogram and angioplasty.  The right femoral area is
prepped and draped in normal sterile fashion.  Right femoral artery was
cannulated via modified Seldinger technique with placement of 6-Pal[place] sheath. 
All catheters exchanged through this sheath.
 
FINDINGS:  Left ventriculogram was performed in standard 30-degree MOREL view,
reveals right coronary has an 80%-90% stenosis in the mid vessel.  This was
addressed with a 3.5 x 18 mm Integrity stent.  Result was 0% residual stenosis.
 
OVERALL IMPRESSION:  Successful percutaneous transluminal coronary angioplasty
stent of the right coronary artery going from 90% initial stenosis to 0%
residual.
 
TRANSINT:[id number] Voice Confirmation ID: [id number] DOCUMENT ID: [id number]
                                           
                                           SRUTHI COHEN MD             
 
 
 
Electronically Signed by SRUHTI COHEN on 17 at 1008
 
 
 
 
 
 
 
 
 
 
 
 
 
 
CC:                                                             [id number]
DICTATION DATE: 03/10/17 0941     :     03/10/17 1311      DEP CLI 
                                                                      03/10/17
[facility]                                          
[address] [place] [address]

## 2018-02-02 ENCOUNTER — HOSPITAL ENCOUNTER (EMERGENCY)
Dept: HOSPITAL 84 - D.ER | Age: 78
Discharge: HOME | End: 2018-02-02
Payer: MEDICARE

## 2018-02-02 VITALS — BODY MASS INDEX: 30.4 KG/M2

## 2018-02-02 DIAGNOSIS — I10: ICD-10-CM
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
## 2018-02-02 DIAGNOSIS — S00.93XA: Primary | ICD-10-CM
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
## 2018-02-02 DIAGNOSIS — M79.1: ICD-10-CM

## 2018-05-10 ENCOUNTER — HOSPITAL ENCOUNTER (EMERGENCY)
Dept: HOSPITAL 84 - D.ER | Age: 78
Discharge: HOME | End: 2018-05-10
Payer: MEDICARE

## 2018-05-10 VITALS — BODY MASS INDEX: 30.4 KG/M2

## 2018-05-10 DIAGNOSIS — I10: ICD-10-CM

## 2018-05-10 DIAGNOSIS — R10.9: ICD-10-CM

## 2018-05-10 DIAGNOSIS — R07.9: Primary | ICD-10-CM

## 2018-05-10 DIAGNOSIS — K59.00: ICD-10-CM

## 2018-05-10 DIAGNOSIS — I49.3: ICD-10-CM

## 2018-05-10 LAB
ALBUMIN SERPL-MCNC: 4.2 G/DL (ref 3.4–5)
ALP SERPL-CCNC: 62 U/L (ref 46–116)
ALT SERPL-CCNC: 35 U/L (ref 10–68)
ANION GAP SERPL CALC-SCNC: 12.5 MMOL/L (ref 8–16)
BASOPHILS NFR BLD AUTO: 0.3 % (ref 0–2)
BILIRUB SERPL-MCNC: 0.84 MG/DL (ref 0.2–1.3)
BUN SERPL-MCNC: 16 MG/DL (ref 7–18)
CALCIUM SERPL-MCNC: 9.5 MG/DL (ref 8.5–10.1)
CHLORIDE SERPL-SCNC: 105 MMOL/L (ref 98–107)
CHOLEST/HDLC SERPL: 3.3 RATIO (ref 2.3–4.9)
CK MB SERPL-MCNC: 1.5 U/L (ref 0–3.6)
CK SERPL-CCNC: 210 UL (ref 21–232)
CO2 SERPL-SCNC: 25.6 MMOL/L (ref 21–32)
CREAT SERPL-MCNC: 1.3 MG/DL (ref 0.6–1.3)
EOSINOPHIL NFR BLD: 1.8 % (ref 0–7)
ERYTHROCYTE [DISTWIDTH] IN BLOOD BY AUTOMATED COUNT: 12.8 % (ref 11.5–14.5)
GLOBULIN SER-MCNC: 3.2 G/L
GLUCOSE SERPL-MCNC: 108 MG/DL (ref 74–106)
HCT VFR BLD CALC: 44.5 % (ref 42–54)
HDLC SERPL-MCNC: 48 MG/DL (ref 32–96)
HGB BLD-MCNC: 15.4 G/DL (ref 13.5–17.5)
IMM GRANULOCYTES NFR BLD: 0.1 % (ref 0–5)
LDL-HDL RATIO: 1.7 RATIO (ref 1.5–3.5)
LDLC SERPL-MCNC: 83 MG/DL (ref 0–100)
LYMPHOCYTES NFR BLD AUTO: 23.3 % (ref 15–50)
MCH RBC QN AUTO: 31.5 PG (ref 26–34)
MCHC RBC AUTO-ENTMCNC: 34.6 G/DL (ref 31–37)
MCV RBC: 91 FL (ref 80–100)
MONOCYTES NFR BLD: 7.9 % (ref 2–11)
NEUTROPHILS NFR BLD AUTO: 66.6 % (ref 40–80)
OSMOLALITY SERPL CALC.SUM OF ELEC: 279 MOSM/KG (ref 275–300)
PLATELET # BLD: 141 10X3/UL (ref 130–400)
PMV BLD AUTO: 9.9 FL (ref 7.4–10.4)
POTASSIUM SERPL-SCNC: 4.1 MMOL/L (ref 3.5–5.1)
PROT SERPL-MCNC: 7.4 G/DL (ref 6.4–8.2)
RBC # BLD AUTO: 4.89 10X6/UL (ref 4.2–6.1)
SODIUM SERPL-SCNC: 139 MMOL/L (ref 136–145)
TRIGL SERPL-MCNC: 127 MG/DL (ref 30–200)
TROPONIN I SERPL-MCNC: < 0.017 NG/ML (ref 0–0.06)
WBC # BLD AUTO: 6.7 10X3/UL (ref 4.8–10.8)

## 2018-07-30 ENCOUNTER — HOSPITAL ENCOUNTER (EMERGENCY)
Dept: HOSPITAL 84 - D.ER | Age: 78
Discharge: HOME | End: 2018-07-30
Payer: MEDICARE

## 2018-07-30 VITALS
BODY MASS INDEX: 33.34 KG/M2 | BODY MASS INDEX: 33.34 KG/M2 | HEIGHT: 68 IN | WEIGHT: 220 LBS | WEIGHT: 220 LBS | HEIGHT: 68 IN

## 2018-07-30 VITALS — DIASTOLIC BLOOD PRESSURE: 82 MMHG | SYSTOLIC BLOOD PRESSURE: 172 MMHG

## 2018-07-30 DIAGNOSIS — R00.1: ICD-10-CM

## 2018-07-30 DIAGNOSIS — K57.92: ICD-10-CM

## 2018-07-30 DIAGNOSIS — I10: ICD-10-CM

## 2018-07-30 DIAGNOSIS — R07.9: Primary | ICD-10-CM

## 2018-07-30 LAB
ALBUMIN SERPL-MCNC: 3.7 G/DL (ref 3.4–5)
ALP SERPL-CCNC: 75 U/L (ref 46–116)
ALT SERPL-CCNC: 25 U/L (ref 10–68)
AMYLASE SERPL-CCNC: 39 U/L (ref 25–115)
ANION GAP SERPL CALC-SCNC: 11.1 MMOL/L (ref 8–16)
APTT BLD: 27.5 SECONDS (ref 22.8–39.4)
BASOPHILS NFR BLD AUTO: 0.8 % (ref 0–2)
BILIRUB SERPL-MCNC: 0.57 MG/DL (ref 0.2–1.3)
BUN SERPL-MCNC: 13 MG/DL (ref 7–18)
CALCIUM SERPL-MCNC: 9.1 MG/DL (ref 8.5–10.1)
CHLORIDE SERPL-SCNC: 106 MMOL/L (ref 98–107)
CK MB SERPL-MCNC: 2.1 U/L (ref 0–3.6)
CK SERPL-CCNC: 207 UL (ref 21–232)
CO2 SERPL-SCNC: 26.6 MMOL/L (ref 21–32)
CREAT SERPL-MCNC: 1 MG/DL (ref 0.6–1.3)
D DIMER PPP FEU-MCNC: 1.3 UG/MLFEU (ref 0.2–0.54)
EOSINOPHIL NFR BLD: 7 % (ref 0–7)
ERYTHROCYTE [DISTWIDTH] IN BLOOD BY AUTOMATED COUNT: 12.8 % (ref 11.5–14.5)
GLOBULIN SER-MCNC: 3.5 G/L
GLUCOSE SERPL-MCNC: 100 MG/DL (ref 74–106)
HCT VFR BLD CALC: 44.6 % (ref 42–54)
HGB BLD-MCNC: 15.4 G/DL (ref 13.5–17.5)
IMM GRANULOCYTES NFR BLD: 0.2 % (ref 0–5)
INR PPP: 1.01 (ref 0.85–1.17)
LIPASE SERPL-CCNC: 108 U/L (ref 73–393)
LYMPHOCYTES NFR BLD AUTO: 30.3 % (ref 15–50)
MCH RBC QN AUTO: 30.9 PG (ref 26–34)
MCHC RBC AUTO-ENTMCNC: 34.5 G/DL (ref 31–37)
MCV RBC: 89.4 FL (ref 80–100)
MONOCYTES NFR BLD: 8.8 % (ref 2–11)
NEUTROPHILS NFR BLD AUTO: 52.9 % (ref 40–80)
NT-PROBNP SERPL-MCNC: 302 PG/ML (ref 0–450)
OSMOLALITY SERPL CALC.SUM OF ELEC: 278 MOSM/KG (ref 275–300)
PLATELET # BLD: 151 10X3/UL (ref 130–400)
PMV BLD AUTO: 9.5 FL (ref 7.4–10.4)
POTASSIUM SERPL-SCNC: 3.7 MMOL/L (ref 3.5–5.1)
PROT SERPL-MCNC: 7.2 G/DL (ref 6.4–8.2)
PROTHROMBIN TIME: 12.7 SECONDS (ref 11.6–15)
RBC # BLD AUTO: 4.99 10X6/UL (ref 4.2–6.1)
SODIUM SERPL-SCNC: 140 MMOL/L (ref 136–145)
TROPONIN I SERPL-MCNC: < 0.017 NG/ML (ref 0–0.06)
WBC # BLD AUTO: 5 10X3/UL (ref 4.8–10.8)

## 2018-10-12 ENCOUNTER — HOSPITAL ENCOUNTER (EMERGENCY)
Dept: HOSPITAL 84 - D.ER | Age: 78
Discharge: HOME | End: 2018-10-12
Payer: MEDICARE

## 2018-10-12 VITALS
BODY MASS INDEX: 33.04 KG/M2 | HEIGHT: 68 IN | BODY MASS INDEX: 33.04 KG/M2 | HEIGHT: 68 IN | WEIGHT: 218 LBS | WEIGHT: 218 LBS

## 2018-10-12 VITALS — SYSTOLIC BLOOD PRESSURE: 125 MMHG | DIASTOLIC BLOOD PRESSURE: 73 MMHG

## 2018-10-12 DIAGNOSIS — J02.9: Primary | ICD-10-CM

## 2018-10-12 DIAGNOSIS — R50.9: ICD-10-CM

## 2018-11-13 ENCOUNTER — HOSPITAL ENCOUNTER (EMERGENCY)
Dept: HOSPITAL 84 - D.ER | Age: 78
Discharge: HOME | End: 2018-11-13
Payer: MEDICARE

## 2018-11-13 VITALS — SYSTOLIC BLOOD PRESSURE: 144 MMHG | DIASTOLIC BLOOD PRESSURE: 77 MMHG

## 2018-11-13 VITALS — HEIGHT: 68 IN

## 2018-11-13 DIAGNOSIS — I10: ICD-10-CM

## 2018-11-13 DIAGNOSIS — I26.99: Primary | ICD-10-CM

## 2018-11-13 DIAGNOSIS — K21.9: ICD-10-CM

## 2018-11-13 DIAGNOSIS — Z86.718: ICD-10-CM

## 2018-11-13 LAB
ALBUMIN SERPL-MCNC: 4 G/DL (ref 3.4–5)
ALP SERPL-CCNC: 68 U/L (ref 46–116)
ALT SERPL-CCNC: 49 U/L (ref 10–68)
ANION GAP SERPL CALC-SCNC: 16 MMOL/L (ref 8–16)
APTT BLD: 26.6 SECONDS (ref 22.8–39.4)
BASOPHILS NFR BLD AUTO: 0.3 % (ref 0–2)
BILIRUB SERPL-MCNC: 0.42 MG/DL (ref 0.2–1.3)
BUN SERPL-MCNC: 18 MG/DL (ref 7–18)
CALCIUM SERPL-MCNC: 9.2 MG/DL (ref 8.5–10.1)
CHLORIDE SERPL-SCNC: 104 MMOL/L (ref 98–107)
CK MB SERPL-MCNC: 2.4 U/L (ref 0–3.6)
CK SERPL-CCNC: 168 UL (ref 21–232)
CO2 SERPL-SCNC: 23.5 MMOL/L (ref 21–32)
CREAT SERPL-MCNC: 1.1 MG/DL (ref 0.6–1.3)
D DIMER PPP FEU-MCNC: 1.34 UG/MLFEU (ref 0.2–0.54)
EOSINOPHIL NFR BLD: 3.8 % (ref 0–7)
ERYTHROCYTE [DISTWIDTH] IN BLOOD BY AUTOMATED COUNT: 12.9 % (ref 11.5–14.5)
GLOBULIN SER-MCNC: 3.2 G/L
GLUCOSE SERPL-MCNC: 126 MG/DL (ref 74–106)
HCT VFR BLD CALC: 43 % (ref 42–54)
HGB BLD-MCNC: 15 G/DL (ref 13.5–17.5)
IMM GRANULOCYTES NFR BLD: 0.4 % (ref 0–5)
INR PPP: 1.02 (ref 0.85–1.17)
LYMPHOCYTES NFR BLD AUTO: 24.1 % (ref 15–50)
MAGNESIUM SERPL-MCNC: 2.2 MG/DL (ref 1.8–2.4)
MCH RBC QN AUTO: 31.3 PG (ref 26–34)
MCHC RBC AUTO-ENTMCNC: 34.9 G/DL (ref 31–37)
MCV RBC: 89.6 FL (ref 80–100)
MONOCYTES NFR BLD: 9 % (ref 2–11)
NEUTROPHILS NFR BLD AUTO: 62.4 % (ref 40–80)
OSMOLALITY SERPL CALC.SUM OF ELEC: 282 MOSM/KG (ref 275–300)
PLATELET # BLD: 166 10X3/UL (ref 130–400)
PMV BLD AUTO: 9.1 FL (ref 7.4–10.4)
POTASSIUM SERPL-SCNC: 3.5 MMOL/L (ref 3.5–5.1)
PROT SERPL-MCNC: 7.2 G/DL (ref 6.4–8.2)
PROTHROMBIN TIME: 13 SECONDS (ref 11.6–15)
RBC # BLD AUTO: 4.8 10X6/UL (ref 4.2–6.1)
SODIUM SERPL-SCNC: 140 MMOL/L (ref 136–145)
TROPONIN I SERPL-MCNC: < 0.017 NG/ML (ref 0–0.06)
WBC # BLD AUTO: 6.8 10X3/UL (ref 4.8–10.8)

## 2018-12-13 ENCOUNTER — HOSPITAL ENCOUNTER (OUTPATIENT)
Dept: HOSPITAL 84 - D.HCCARDIO | Age: 78
Discharge: HOME | End: 2018-12-13
Attending: INTERNAL MEDICINE
Payer: MEDICARE

## 2018-12-13 DIAGNOSIS — I25.10: Primary | ICD-10-CM

## 2019-08-07 ENCOUNTER — HOSPITAL ENCOUNTER (OUTPATIENT)
Dept: HOSPITAL 84 - D.CT | Age: 79
Discharge: HOME | End: 2019-08-07
Attending: THORACIC SURGERY (CARDIOTHORACIC VASCULAR SURGERY)
Payer: MEDICARE

## 2019-08-07 DIAGNOSIS — I71.4: Primary | ICD-10-CM

## 2019-09-30 ENCOUNTER — HOSPITAL ENCOUNTER (OUTPATIENT)
Dept: HOSPITAL 84 - D.HCCECHO | Age: 79
Discharge: HOME | End: 2019-09-30
Attending: INTERNAL MEDICINE
Payer: MEDICARE

## 2019-09-30 DIAGNOSIS — R06.00: Primary | ICD-10-CM

## 2019-10-10 NOTE — EC
PATIENT:BEA HAN                 DATE OF SERVICE: 09/30/19
SEX: M                                  MEDICAL RECORD: [id number]
DATE OF BIRTH: [birth year]                        [facility]          
AGE OF PATIENT: 78                             ADMISSION DATE: 09/30/19
 
REFERRING PHYSICIAN:                               
 
INTERPRETING PHYSICIAN: SRUTHI MANJARREZ MD             
 
 
 
                             ECHOCARDIOGRAM REPORT
  ECHO CHARGES 4               ECHO COMPLETE                 Date: 09/30/19
 
 
 
CLINICAL DIAGNOSIS: EDEMA                         
                    H/O CAD/HTN                   
                         ECHOCARDIOGRAPHIC MEASUREMENTS
      (adult normal given)
   AC root (d.<3.7cm) 3.9  cm   LV Septum d (<1.2 cm> 1.2  cm
      Valve Excursion 2.1  cm     LV Septum (systole) 1.7  cm
Left Atria (s.<4.0cm> 4.4  cm          LVPW d(<1.2cm) 1.0  cm
        RV (d.<2.3cm) 2.5  cm           LVPW (sytole) 1.6  cm
  LV diastole(<5.6CM) 6.4  cm       MV E-F(>70mm/sec)      cm
           LV systole 4.2  cm           LVOT Diameter 2.1  cm
       MV exc.(>10mm)      cm
Est.ejection fraction (50-75%)     %
 
   DOPPLER:
     LVIT      cm/sec A 69.0 cm/sec E 54.0  cm/sec
       LA      cm/sec      RVSP 22.0 mmHg
     LVOT 100  cm/sec   AOP1/2T      m/s
  Asc. Ao 127  cm/sec
     RVOT 55.0 cm/sec
       RA      cm/sec
       PA 76.0 cm/sec
 AV Gradient Peak 6.4  mmHg  AV Mean 3.5  mmHg  AV Area 2.4  cm
 MV Gradient Peak 2.4  mmHg  MV Mean 0.58 mmHg  MV Area      cm
   COMMENTS: OP - HC                                      
 
 
 Cardiac Sonographer: 1               ORACIO [place]            
      Cardiologist: 1          Dr. Manjarrez                
             TAPE# PACS           
                                       Pericardial Effusion N                        
 
 
DATE OF SERVICE:  
 
PROCEDURE:  Echocardiogram.
 
FINDINGS:
1.  Left ventricular chamber size is dilated.  Left ventricular systolic
function is mildly reduced at 40%.
2.  Left atrium is enlarged at 4.4 cm.  Right atrium and right ventricular
chamber sizes are within normal limits.
3.  Valvular structures have normal structure and motion.
 
 
 
ECHOCARDIOGRAM REPORT                          [id number]    BEA HAN         
 
 
4.  Doppler interrogation reveals trace mitral regurgitation, no other valvular
insufficiency or stenosis.  Pulmonary systolic pressure is estimated at 22 mmHg.
5.  No evidence of pericardial effusion or left ventricular thrombus.
 
TRANSINT:[id number] Voice Confirmation ID: [id number] DOCUMENT ID: [id number]
                                           
                                           SRUTHI MANJARREZ MD             
 
 
 
Electronically Signed by SRUTHI MANJARREZ on 10/10/19 at 1330
 
 
 
 
 
 
 
 
 
 
 
 
 
 
 
 
 
 
 
 
 
 
 
 
 
 
 
 
 
 
 
 
 
 
 
CC:                                                             [id number]
DICTATION DATE: 10/01/19 1802     :     10/02/19 0151      DEP CLI 
                                                                      09/30/19
[place] [address] [place] [address]

## 2020-02-25 ENCOUNTER — HOSPITAL ENCOUNTER (EMERGENCY)
Dept: HOSPITAL 84 - D.ER | Age: 80
Discharge: HOME | End: 2020-02-25
Payer: MEDICARE

## 2020-02-25 VITALS — HEIGHT: 68 IN | BODY MASS INDEX: 34.93 KG/M2 | WEIGHT: 230.48 LBS

## 2020-02-25 VITALS — SYSTOLIC BLOOD PRESSURE: 138 MMHG | DIASTOLIC BLOOD PRESSURE: 74 MMHG

## 2020-02-25 DIAGNOSIS — I10: ICD-10-CM

## 2020-02-25 DIAGNOSIS — F41.9: Primary | ICD-10-CM

## 2020-02-25 DIAGNOSIS — R10.9: ICD-10-CM

## 2020-02-25 DIAGNOSIS — K21.9: ICD-10-CM

## 2020-02-25 DIAGNOSIS — R07.9: ICD-10-CM

## 2020-02-25 LAB
ALBUMIN SERPL-MCNC: 4.1 G/DL (ref 3.4–5)
ALP SERPL-CCNC: 73 U/L (ref 30–120)
ALT SERPL-CCNC: 46 U/L (ref 10–68)
ANION GAP SERPL CALC-SCNC: 13.3 MMOL/L (ref 8–16)
APTT BLD: 29.4 SECONDS (ref 22.8–39.4)
BASOPHILS NFR BLD AUTO: 0.5 % (ref 0–2)
BILIRUB SERPL-MCNC: 0.72 MG/DL (ref 0.2–1.3)
BILIRUB SERPL-MCNC: NEGATIVE MG/DL
BUN SERPL-MCNC: 17 MG/DL (ref 7–18)
CALCIUM SERPL-MCNC: 9 MG/DL (ref 8.5–10.1)
CHLORIDE SERPL-SCNC: 102 MMOL/L (ref 98–107)
CK MB SERPL-MCNC: 4.9 U/L (ref 0–3.6)
CK SERPL-CCNC: 411 UL (ref 21–232)
CO2 SERPL-SCNC: 27.7 MMOL/L (ref 21–32)
CREAT SERPL-MCNC: 1.5 MG/DL (ref 0.6–1.3)
D DIMER PPP FEU-MCNC: 0.4 UG/MLFEU (ref 0.2–0.54)
EOSINOPHIL NFR BLD: 3 % (ref 0–7)
ERYTHROCYTE [DISTWIDTH] IN BLOOD BY AUTOMATED COUNT: 12.7 % (ref 11.5–14.5)
GLOBULIN SER-MCNC: 3.5 G/L
GLUCOSE SERPL-MCNC: 131 MG/DL (ref 74–106)
GLUCOSE SERPL-MCNC: NEGATIVE MG/DL
HCT VFR BLD CALC: 42.8 % (ref 42–54)
HGB BLD-MCNC: 14.5 G/DL (ref 13.5–17.5)
IMM GRANULOCYTES NFR BLD: 0.3 % (ref 0–5)
INR PPP: 1.2 (ref 0.85–1.17)
KETONES UR STRIP-MCNC: NEGATIVE MG/DL
LIPASE SERPL-CCNC: 72 U/L (ref 73–393)
LYMPHOCYTES NFR BLD AUTO: 17.5 % (ref 15–50)
MCH RBC QN AUTO: 31.5 PG (ref 26–34)
MCHC RBC AUTO-ENTMCNC: 33.9 G/DL (ref 31–37)
MCV RBC: 92.8 FL (ref 80–100)
MONOCYTES NFR BLD: 6.8 % (ref 2–11)
NEUTROPHILS NFR BLD AUTO: 71.9 % (ref 40–80)
NITRITE UR-MCNC: NEGATIVE MG/ML
NT-PROBNP SERPL-MCNC: 98 PG/ML (ref 0–450)
OSMOLALITY SERPL CALC.SUM OF ELEC: 281 MOSM/KG (ref 275–300)
PH UR STRIP: 8 [PH] (ref 5–6)
PLATELET # BLD: 127 10X3/UL (ref 130–400)
PMV BLD AUTO: 8.8 FL (ref 7.4–10.4)
POTASSIUM SERPL-SCNC: 4 MMOL/L (ref 3.5–5.1)
PROT SERPL-MCNC: 7.6 G/DL (ref 6.4–8.2)
PROTHROMBIN TIME: 15.1 SECONDS (ref 11.6–15)
RBC # BLD AUTO: 4.61 10X6/UL (ref 4.2–6.1)
SODIUM SERPL-SCNC: 139 MMOL/L (ref 136–145)
SP GR UR STRIP: 1.01 (ref 1–1.02)
TROPONIN I SERPL-MCNC: < 0.017 NG/ML (ref 0–0.06)
UROBILINOGEN UR-MCNC: 1 MG/DL
WBC # BLD AUTO: 3.7 10X3/UL (ref 4.8–10.8)